# Patient Record
Sex: MALE | Race: BLACK OR AFRICAN AMERICAN | Employment: UNEMPLOYED | URBAN - METROPOLITAN AREA
[De-identification: names, ages, dates, MRNs, and addresses within clinical notes are randomized per-mention and may not be internally consistent; named-entity substitution may affect disease eponyms.]

---

## 2019-11-01 ENCOUNTER — OFFICE VISIT (OUTPATIENT)
Dept: CARDIOLOGY CLINIC | Age: 29
End: 2019-11-01

## 2019-11-01 VITALS
DIASTOLIC BLOOD PRESSURE: 89 MMHG | HEIGHT: 69 IN | BODY MASS INDEX: 33.47 KG/M2 | SYSTOLIC BLOOD PRESSURE: 144 MMHG | WEIGHT: 226 LBS | HEART RATE: 86 BPM

## 2019-11-01 DIAGNOSIS — R06.09 DOE (DYSPNEA ON EXERTION): Primary | ICD-10-CM

## 2019-11-01 DIAGNOSIS — Z82.49 FAMILY HISTORY OF PREMATURE CAD: ICD-10-CM

## 2019-11-01 DIAGNOSIS — R94.31 ABNORMAL ECG: ICD-10-CM

## 2019-11-01 DIAGNOSIS — R93.1 ABNORMAL ECHOCARDIOGRAM: ICD-10-CM

## 2019-11-01 NOTE — PATIENT INSTRUCTIONS
Echo-  Please call central scheduling at [phone]      All testing/lab work is completed at [facility]hav [address], [facility]     Call office 2 days after testing for results

## 2019-11-04 ENCOUNTER — TELEPHONE (OUTPATIENT)
Dept: CARDIOLOGY CLINIC | Age: 29
End: 2019-11-04

## 2019-11-04 NOTE — TELEPHONE ENCOUNTER
Patient needs to have stress echo as soon as possible so he can be cleared to go back to work. Patient first available appt for testing with [facility] or Donna Madrigal was November 20th. Verbal order and read back per Raúl Cota MD  See how soon testing can be scheduled at [facility] if nothing sooner for [facility] or [facility]. Faxed order to [facility]. Called to make patient aware -will will call and see how quickly sentara can get it scheduled.

## 2019-11-11 PROBLEM — Z82.49 FAMILY HISTORY OF PREMATURE CAD: Status: ACTIVE | Noted: 2019-11-11

## 2019-11-11 PROBLEM — R94.31 ABNORMAL ECG: Status: ACTIVE | Noted: 2019-11-11

## 2019-11-11 NOTE — PROGRESS NOTES
Subjective:      Sarah Springer is in the office today for cardiac evaluation. 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 The patient had an electrocardiogram done on 10/18/2019 which demonstrated  nondiagnostic inferolateral ST-T wave abnormalities. He is in the office today in that regard. The patient has no prior cardiac history. He has had no history of chest pain or shortness of breath. He has had no PND or orthopnea. No peripheral edema. Has had no palpitations, near-syncope or syncope. He exercises primarily with dancing. He has no limiting shortness of breath. Patient's cardiac risk factors are remote smoking/ tobacco exposure, family history. Patient Active Problem List    Diagnosis Date Noted    Abnormal ECG 11/11/2019    Family history of premature CAD 11/11/2019       No Known Allergies  History reviewed. No pertinent past medical history. History reviewed. No pertinent surgical history. No family history on file. Social History     Tobacco Use   Smoking Status Never Smoker   Smokeless Tobacco Never Used          Review of Systems, additional:  Constitutional: negative  Eyes: negative  Respiratory: negative  Cardiovascular: negative  Gastrointestinal: negative  Musculoskeletal:negative  Neurological: negative  Behvioral/Psych: negative  Endocrine: negative  ENT: negative    Objective:     Visit Vitals  /89   Pulse 86   Ht 5' 9\" (1.753 m)   Wt 102.5 kg (226 lb)   BMI 33.37 kg/m²     General:  alert, cooperative, no distress   Chest Wall: inspection normal - no chest wall deformities or tenderness, respiratory effort normal   Lung: clear to auscultation bilaterally   Heart:  normal rate and regular rhythm, S1 and S2 normal, no murmurs noted, no gallops noted, no JVD   Abdomen: soft, non-tender.  Bowel sounds normal. No masses,  no organomegaly   Extremities: extremities normal, atraumatic, no cyanosis or edema Skin: no rashes Neuro: alert, oriented, normal speech, no focal findings or movement disorder noted     EKG: 10/18/2019. Sinus rhythm. Nondiagnostic inferolateral ST and T wave abnormalities. Assessment/Plan:       ICD-10-CM ICD-9-CM                  1 Abnormal ECG, will order stress echocardiogram.  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  R94.31 794.31    2 Family history of premature CAD Z82.49 V17.3

## 2019-11-12 ENCOUNTER — TELEPHONE (OUTPATIENT)
Dept: CARDIOLOGY CLINIC | Age: 29
End: 2019-11-12

## 2019-11-12 NOTE — TELEPHONE ENCOUNTER
Contacted pt at [facility] number. Two patient Identifiers confirmed. Advised pt per Dr Juice Diop notes. Pt verbalized understanding and asked that his paperwork be faxed to the number on the sheet and a copy be mailed to him  (address confirmed). No other issues noted.

## 2019-11-12 NOTE — TELEPHONE ENCOUNTER
Patient called to check on results from stress echo completed with Dalia. Patient would like call back with results.

## 2019-12-05 DIAGNOSIS — Z82.49 FAMILY HISTORY OF PREMATURE CAD: ICD-10-CM

## 2019-12-05 DIAGNOSIS — R93.1 ABNORMAL ECHOCARDIOGRAM: ICD-10-CM

## 2022-03-04 ENCOUNTER — OFFICE VISIT (OUTPATIENT)
Dept: CARDIOLOGY CLINIC | Age: 32
End: 2022-03-04

## 2022-03-04 VITALS — RESPIRATION RATE: 16 BRPM

## 2022-03-04 DIAGNOSIS — R94.31 ABNORMAL ECG: Primary | ICD-10-CM

## 2022-03-04 DIAGNOSIS — Z82.49 FAMILY HISTORY OF PREMATURE CAD: ICD-10-CM

## 2022-03-04 PROCEDURE — 99214 OFFICE O/P EST MOD 30 MIN: CPT | Performed by: INTERNAL MEDICINE

## 2022-03-04 NOTE — PROGRESS NOTES
Identified pt with two pt identifiers(name and ). Reviewed record in preparation for visit and have obtained necessary documentation. Giovanni Diop presents today for   Chief Complaint   Patient presents with    New Patient     re-establish care last seen 2019                 Giovanni Diop preferred language for health care discussion is english/other. Personal Protective Equipment:   Personal Protective Equipment was used including: mask-surgical and hands-gloves. Patient was placed on no precaution(s). Patient was masked. Precautions:   Patient currently on None  Patient currently roomed with door closed. Is someone accompanying this pt? no  Is the patient using any DME equipment during [address]? no    Depression Screening:  3 most recent PHQ Screens 3/4/2022   Little interest or pleasure in doing things Not at all   Feeling down, depressed, irritable, or hopeless Not at all   Total Score PHQ 2 0       Learning Assessment:  No flowsheet data found. Abuse Screening:  No flowsheet data found. Fall Risk  No flowsheet data found. Pt currently taking Anticoagulant therapy? no  Pt currently taking Antiplatelet therapy? no    Coordination of Care:  1. Have you been to the ER, urgent care clinic since your last visit? Hospitalized since your last visit? no    2. Have you seen or consulted any other health care providers outside of the [address] [place] [address] since your last visit? Include any pap smears or colon screening. no      Please see Red banners under Allergies and Med Rec to remove outside inquires. All correct information has been verified with patient and added to chart.      Medication's patient's would liked removed has been marked not taking to be removed per Verbal order and read back per Varun Ochoa MD

## 2022-03-07 ENCOUNTER — CLINICAL SUPPORT (OUTPATIENT)
Dept: CARDIOLOGY CLINIC | Age: 32
End: 2022-03-07

## 2022-03-07 VITALS — SYSTOLIC BLOOD PRESSURE: 116 MMHG | DIASTOLIC BLOOD PRESSURE: 69 MMHG | HEART RATE: 66 BPM

## 2022-03-07 DIAGNOSIS — Z01.30 BLOOD PRESSURE CHECK: Primary | ICD-10-CM

## 2022-03-07 NOTE — PROGRESS NOTES
Jacobo Suh was seen in our office today for BP evaluation. Listed below are the patients current meds:    No current outpatient medications on file. No current facility-administered medications for this visit. Visit Vitals  /69   Pulse 66       Plan:     Patient to continue current medications. Advised patient that I would forward to Dr. Keena Moon for review and further instructions. Advised patient that we would call within 24-48 hours with any changes. Patient verbalized understanding.

## 2022-03-17 ENCOUNTER — TELEPHONE (OUTPATIENT)
Dept: CARDIOLOGY CLINIC | Age: 32
End: 2022-03-17

## 2022-03-17 NOTE — TELEPHONE ENCOUNTER
[facility] Two patient Identifiers confirmed. Advised pt paperwork pending signature. Pt verbalized understanding.

## 2022-03-17 NOTE — TELEPHONE ENCOUNTER
Called wanting to know if the paperwork he brought with him to his appt on 03/04 has been reviewed by igor and sent to his job.  Please call patient back

## 2022-03-20 NOTE — PROGRESS NOTES
Subjective:      Quincy Gilliam is in the office today for cardiac reevaluation. 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 He was previously seen in 2019 for the same reason at which time a stress echocardiogram was ordered. The stress test was completely normal.     The patient has no prior cardiac history. He has had no history of chest pain or shortness of breath. He has had no PND or orthopnea. No peripheral edema. Has had no palpitations, near-syncope or syncope. He has no limiting shortness of breath. Patient's cardiac risk factors are remote smoking/ tobacco exposure, family history. Patient Active Problem List    Diagnosis Date Noted    Abnormal ECG 11/11/2019    Family history of premature CAD 11/11/2019       No Known Allergies  No past medical history on file. No past surgical history on file. No family history on file. Social History     Tobacco Use   Smoking Status Never Smoker   Smokeless Tobacco Never Used          Review of Systems, additional:  Constitutional: negative  Eyes: negative  Respiratory: negative  Cardiovascular: negative  Gastrointestinal: negative  Musculoskeletal:negative  Neurological: negative  Behvioral/Psych: negative  Endocrine: negative  ENT: negative    Objective:     Visit Vitals  BP (P) 122/69   Pulse (P) 78   Temp (P) 97 °F (36.1 °C) (Temporal)   Resp 16   Wt (P) 104.3 kg (230 lb)   SpO2 (P) 98%   BMI (P) 33.97 kg/m²     General:  alert, cooperative, no distress   Chest Wall: inspection normal - no chest wall deformities or tenderness, respiratory effort normal   Lung: clear to auscultation bilaterally   Heart:  normal rate and regular rhythm, S1 and S2 normal, no murmurs noted, no gallops noted, no JVD   Abdomen: soft, non-tender.  Bowel sounds normal. No masses,  no organomegaly   Extremities: extremities normal, atraumatic, no cyanosis or edema Skin: no rashes   Neuro: alert, oriented, normal speech, no focal findings or movement disorder noted     EKG: Sinus bradycardia. RSR in V1. Assessment/Plan:       ICD-10-CM ICD-9-CM                  1 Abnormal ECG, the patient had a normal stress echocardiogram in 2019. Order repeat treadmill stress test.  Return in 2 weeks.  R94.31 794.31    2 Family history of premature CAD Z82.49 V17.3